# Patient Record
Sex: MALE | URBAN - METROPOLITAN AREA
[De-identification: names, ages, dates, MRNs, and addresses within clinical notes are randomized per-mention and may not be internally consistent; named-entity substitution may affect disease eponyms.]

---

## 2021-04-19 ENCOUNTER — OFFICE VISIT (OUTPATIENT)
Dept: FAMILY MEDICINE | Facility: CLINIC | Age: 56
End: 2021-04-19
Payer: COMMERCIAL

## 2021-04-19 ENCOUNTER — ANCILLARY PROCEDURE (OUTPATIENT)
Dept: GENERAL RADIOLOGY | Facility: CLINIC | Age: 56
End: 2021-04-19
Attending: PHYSICIAN ASSISTANT
Payer: COMMERCIAL

## 2021-04-19 VITALS
WEIGHT: 142.38 LBS | HEART RATE: 69 BPM | OXYGEN SATURATION: 100 % | TEMPERATURE: 97.5 F | DIASTOLIC BLOOD PRESSURE: 78 MMHG | RESPIRATION RATE: 12 BRPM | SYSTOLIC BLOOD PRESSURE: 120 MMHG | BODY MASS INDEX: 22.88 KG/M2 | HEIGHT: 66 IN

## 2021-04-19 DIAGNOSIS — S63.633A SPRAIN OF INTERPHALANGEAL JOINT OF LEFT MIDDLE FINGER, INITIAL ENCOUNTER: Primary | ICD-10-CM

## 2021-04-19 DIAGNOSIS — M79.645 PAIN OF FINGER OF LEFT HAND: ICD-10-CM

## 2021-04-19 PROCEDURE — 73140 X-RAY EXAM OF FINGER(S): CPT | Mod: LT | Performed by: RADIOLOGY

## 2021-04-19 PROCEDURE — 99203 OFFICE O/P NEW LOW 30 MIN: CPT | Performed by: PHYSICIAN ASSISTANT

## 2021-04-19 SDOH — HEALTH STABILITY: MENTAL HEALTH: HOW OFTEN DO YOU HAVE A DRINK CONTAINING ALCOHOL?: NEVER

## 2021-04-19 ASSESSMENT — MIFFLIN-ST. JEOR: SCORE: 1408.56

## 2021-04-19 NOTE — PROGRESS NOTES
"    Assessment & Plan     Pain of finger of left hand  Sprain of interphalangeal joint of left middle finger, initial encounter  Patient is a 56-year-old male presents to clinic due to pain of left third finger at PIP joint ongoing for 2 weeks after jamming finger.  Vital signs normal.  Physical exam significant for tenderness palpation of left third PIP.  No deformity, swelling, ecchymosis, erythema.  X-rays negative for fracture.  Symptoms are most consistent with sprain.  Discussed symptomatic management with compression, rest, Tylenol/ibuprofen, and ice.  - XR Finger Left G/E 2 Views       See Patient Instructions    Return in about 4 weeks (around 5/17/2021), or if symptoms worsen or fail to improve.    [facility] RENETTA Davis is a 56 year old who presents for the following health issues     HPI     Musculoskeletal problem/pain  Onset/Duration: 2 weeks, left 3rd finger pain that started after playing with son and jamming finger. Pain located to PIP joint. Mild swelling, normal ROM.   Description  Location: middle finger - left  Joint Swelling: no  Redness: no  Pain: YES  Warmth: no  Intensity:  moderate  Progression of Symptoms:  intermittent  Accompanying signs and symptoms:   Fevers: no  Numbness/tingling/weakness: no  History  Trauma to the area: No but he notes that he may have hit finger on something when playing with son  Recent illness:  no  Previous similar problem: no  Previous evaluation:  no  Precipitating or alleviating factors:  Aggravating factors include: gripping anything  Therapies tried and outcome: nothing            Objective    /78   Pulse 69   Temp 97.5  F (36.4  C) (Tympanic)   Resp 12   Ht 1.676 m (5' 6\")   Wt 64.6 kg (142 lb 6 oz)   SpO2 100%   BMI 22.98 kg/m    Body mass index is 22.98 kg/m .  Physical Exam  Vitals signs and nursing note reviewed.   Constitutional:       General: He is not in acute distress.     " Appearance: Normal appearance.   HENT:      Head: Normocephalic and atraumatic.   Eyes:      Extraocular Movements: Extraocular movements intact.      Pupils: Pupils are equal, round, and reactive to light.   Neck:      Musculoskeletal: Normal range of motion.   Cardiovascular:      Rate and Rhythm: Normal rate.   Pulmonary:      Effort: Pulmonary effort is normal.   Musculoskeletal: Normal range of motion.      Comments: Sensation intact to left hand.  No erythema, ecchymosis, deformity.  No significant swelling.  Tenderness palpation of left third PIP joint.  Normal range of motion of left wrist, hand, digits.   Skin:     General: Skin is warm and dry.   Neurological:      General: No focal deficit present.      Mental Status: He is alert.   Psychiatric:         Mood and Affect: Mood normal.         Behavior: Behavior normal.            Xray -  IMPRESSION: Normal joint spacing and alignment. No evidence of acute  fracture.

## 2021-04-19 NOTE — PATIENT INSTRUCTIONS
-Your x-rays do not show any broken bones  -Your pain is likely due to a finger sprain.  Please see the attached handout for further information.  For pain relief you may use Tylenol/ibuprofen, ice, and the wrap provided if needed.  Do not wear the wrap all the time as this will cause your finger to be stiff.  Make sure to move your finger as you normally would.    Patient Education     Finger Sprain  A sprain is a stretching or tearing of the ligaments that hold a joint together. There are no broken bones. Sprains take 3 to 6 weeks or more to heal.  A sprained finger may be treated with a splint or buddy tape. This is when you tape the injured finger to the one next to it for support. Minor sprains may require no additional support.  Home care    Keep your hand elevated to reduce pain and swelling. This is very important during the first 48 hours.    Apply an ice pack over the injured area for 15 to 20 minutes every 3 to 6 hours. You should do this for the first 24 to 48 hours. You can make an ice pack by filling a plastic bag that seals at the top with ice cubes and then wrapping it with a thin towel. Continue the use of ice packs for relief of pain and swelling as needed. As the ice melts, be careful to avoid getting any wrap or splint wet. After 48 hours, apply heat (warm shower or warm bath) for 15 to 20 minutes several times a day, or alternate ice and heat.    If buddy tape was applied and it becomes wet or dirty, change it. You may replace it with paper, plastic or cloth tape. Cloth tape and paper tapes must be kept dry. Apply gauze or cotton padding between the fingers, especially at the webbed space. This will help prevent the skin from getting moist and breaking down. Keep the buddy tape in place for at least 4 weeks, or as instructed by your healthcare provider.    If a splint was applied, wear it for the time advised.    You may use over-the-counter pain medicine to control pain, unless another pain  medicine was prescribed. If you have chronic liver or kidney disease or ever had a stomach ulcer or gastrointestinal bleeding, talk with your healthcare provider before using these medicines.  Follow-up care  Follow up with your healthcare provider, or as directed. Finger joints will become stiff if immobile for too long. If a splint was applied, ask your healthcare provider when it is safe to begin range-of-motion exercises.  Sometimes fractures don t show up on the first X-ray. Bruises and sprains can sometimes hurt as much as a fracture. These injuries can take time to heal completely. If your symptoms don t improve or they get worse, talk with your healthcare provider. You may need a repeat X-ray. If X-rays were taken, you will be told of any new findings that may affect your care.  When to seek medical advice  Call your healthcare provider right away if any of these occur:    Pain or swelling increases    Fingers or hand becomes cold, blue, numb, or tingly  Mark[employer] last reviewed this educational content on 5/1/2018 2000-2021 [employer]. All rights reserved. This information is not intended as a substitute for professional medical care. Always follow your healthcare professional's instructions.

## 2021-08-22 ENCOUNTER — HEALTH MAINTENANCE LETTER (OUTPATIENT)
Age: 56
End: 2021-08-22

## 2021-10-17 ENCOUNTER — HEALTH MAINTENANCE LETTER (OUTPATIENT)
Age: 56
End: 2021-10-17

## 2022-10-03 ENCOUNTER — HEALTH MAINTENANCE LETTER (OUTPATIENT)
Age: 57
End: 2022-10-03

## 2023-10-21 ENCOUNTER — HEALTH MAINTENANCE LETTER (OUTPATIENT)
Age: 58
End: 2023-10-21